# Patient Record
Sex: FEMALE | URBAN - METROPOLITAN AREA
[De-identification: names, ages, dates, MRNs, and addresses within clinical notes are randomized per-mention and may not be internally consistent; named-entity substitution may affect disease eponyms.]

---

## 2020-11-19 ENCOUNTER — APPOINTMENT (RX ONLY)
Dept: URBAN - METROPOLITAN AREA CLINIC 41 | Facility: CLINIC | Age: 35
Setting detail: DERMATOLOGY
End: 2020-11-19

## 2020-11-19 VITALS — TEMPERATURE: 96.8 F

## 2020-11-19 DIAGNOSIS — Z41.9 ENCOUNTER FOR PROCEDURE FOR PURPOSES OTHER THAN REMEDYING HEALTH STATE, UNSPECIFIED: ICD-10-CM

## 2020-11-19 DIAGNOSIS — H01.13 ECZEMATOUS DERMATITIS OF EYELID: ICD-10-CM

## 2020-11-19 PROBLEM — H01.134 ECZEMATOUS DERMATITIS OF LEFT UPPER EYELID: Status: ACTIVE | Noted: 2020-11-19

## 2020-11-19 PROBLEM — H01.131 ECZEMATOUS DERMATITIS OF RIGHT UPPER EYELID: Status: ACTIVE | Noted: 2020-11-19

## 2020-11-19 PROCEDURE — ? TREATMENT REGIMEN

## 2020-11-19 PROCEDURE — 99202 OFFICE O/P NEW SF 15 MIN: CPT

## 2020-11-19 PROCEDURE — ? COSMETIC CONSULTATION: BOTULINUM TOXIN

## 2020-11-19 PROCEDURE — ? PRESCRIPTION

## 2020-11-19 PROCEDURE — ? COUNSELING

## 2020-11-19 RX ORDER — HYDROCORTISONE 25 MG/G
OINTMENT TOPICAL
Qty: 1 | Refills: 3 | Status: ERX | COMMUNITY
Start: 2020-11-19

## 2020-11-19 RX ORDER — PIMECROLIMUS 10 MG/G
CREAM TOPICAL BID
Qty: 1 | Refills: 3 | Status: ERX | COMMUNITY
Start: 2020-11-19

## 2020-11-19 RX ADMIN — PIMECROLIMUS AS DIRECTED: 10 CREAM TOPICAL at 00:00

## 2020-11-19 RX ADMIN — HYDROCORTISONE AS DIRECTED: 25 OINTMENT TOPICAL at 00:00

## 2020-11-19 ASSESSMENT — LOCATION ZONE DERM: LOCATION ZONE: EYELID

## 2020-11-19 ASSESSMENT — LOCATION SIMPLE DESCRIPTION DERM
LOCATION SIMPLE: RIGHT SUPERIOR EYELID
LOCATION SIMPLE: LEFT SUPERIOR EYELID

## 2020-11-19 ASSESSMENT — LOCATION DETAILED DESCRIPTION DERM
LOCATION DETAILED: LEFT LATERAL SUPERIOR EYELID
LOCATION DETAILED: RIGHT LATERAL SUPERIOR EYELID

## 2020-11-19 NOTE — HPI: RASH
What Type Of Note Output Would You Prefer (Optional)?: Standard Output
Is The Patient Presenting As Previously Scheduled?: Yes
How Severe Is Your Rash?: mild
Is This A New Presentation, Or A Follow-Up?: Rash
Additional History: Patient reports she gets flares on around her eyes when the weather is cold, makeup.  The patient recently moved back from [place], [place].  She had tremendous problems with eyelid dermatitis at that time.  She was seen for allergy testing and found to have numerous allergies, environmental and cosmetic.  Since returning to the [facility] she has had some improvement.  Her recent flare she attributes to the cold and dry weather as well as stressful event in her life with associated crying/rubbing of her eyes.

## 2020-11-19 NOTE — PROCEDURE: TREATMENT REGIMEN
Detail Level: Simple
Initiate Treatment: Hydrocortisone 2.5% topical cream\\nElidel 1% topical cream
Plan: Apply a pea size amount of hydrocortisone to affected eyelids twice a day until resolved. Apply elidel 1% topical cream to control flares. If patient fails to see results with hydrocortisone office will send in fluticasone topical cream.

## 2021-05-20 ENCOUNTER — APPOINTMENT (RX ONLY)
Dept: URBAN - METROPOLITAN AREA CLINIC 41 | Facility: CLINIC | Age: 36
Setting detail: DERMATOLOGY
End: 2021-05-20

## 2021-05-20 DIAGNOSIS — Z41.9 ENCOUNTER FOR PROCEDURE FOR PURPOSES OTHER THAN REMEDYING HEALTH STATE, UNSPECIFIED: ICD-10-CM

## 2021-05-20 PROCEDURE — ? BOTOX

## 2021-05-20 NOTE — PROCEDURE: BOTOX
Consent: Written consent obtained. Risks include but not limited to lid/brow ptosis, bruising, swelling, diplopia temporary effect, incomplete chemical denervation
Forehead Units: 0
Show Additional Area 1: Yes
Detail Level: Simple
Expiration Date (Month Year): 07/2023
Show Right And Left Periorbital Units: No
Lot #: [id number]
Additional Area 1 Location: glabella,forehead, periorbital skin
Additional Area 4 Location: chin
Additional Area 5 Location: neck
Dilution (U/0.1 Cc): 1
Additional Area 1 Units: 25
Additional Area 3 Location: see diagram

## 2021-09-01 ENCOUNTER — APPOINTMENT (RX ONLY)
Dept: URBAN - METROPOLITAN AREA CLINIC 41 | Facility: CLINIC | Age: 36
Setting detail: DERMATOLOGY
End: 2021-09-01

## 2021-09-01 DIAGNOSIS — D22 MELANOCYTIC NEVI: ICD-10-CM

## 2021-09-01 DIAGNOSIS — Z41.9 ENCOUNTER FOR PROCEDURE FOR PURPOSES OTHER THAN REMEDYING HEALTH STATE, UNSPECIFIED: ICD-10-CM

## 2021-09-01 PROBLEM — D22.4 MELANOCYTIC NEVI OF SCALP AND NECK: Status: ACTIVE | Noted: 2021-09-01

## 2021-09-01 PROCEDURE — ? BOTOX

## 2021-09-01 PROCEDURE — ? COUNSELING

## 2021-09-01 ASSESSMENT — LOCATION DETAILED DESCRIPTION DERM: LOCATION DETAILED: RIGHT CLAVICULAR NECK

## 2021-09-01 ASSESSMENT — LOCATION SIMPLE DESCRIPTION DERM: LOCATION SIMPLE: RIGHT ANTERIOR NECK

## 2021-09-01 ASSESSMENT — LOCATION ZONE DERM: LOCATION ZONE: NECK

## 2021-09-01 NOTE — PROCEDURE: BOTOX
Additional Area 2 Location: chin
Show Additional Area 2: Yes
Additional Area 6 Units: 0
Show Right And Left Brow Units: No
Lot #: [id number]
Additional Area 1 Location: glabella, periorbital skin, forehead,
Additional Area 3 Units: 25
Expiration Date (Month Year): 10/2023
Consent: Written consent obtained. Risks include but not limited to lid/brow ptosis, bruising, swelling, diplopia temporary effect, incomplete chemical denervation
Additional Area 5 Location: neck
Dilution (U/0.1 Cc): 1
Additional Area 3 Location: see diagram
Detail Level: Simple